# Patient Record
Sex: MALE | Race: BLACK OR AFRICAN AMERICAN | URBAN - METROPOLITAN AREA
[De-identification: names, ages, dates, MRNs, and addresses within clinical notes are randomized per-mention and may not be internally consistent; named-entity substitution may affect disease eponyms.]

---

## 2017-04-27 ENCOUNTER — HISTORICAL (OUTPATIENT)
Dept: ADMINISTRATIVE | Facility: HOSPITAL | Age: 72
End: 2017-04-27

## 2018-10-31 ENCOUNTER — HOSPITAL ENCOUNTER (OUTPATIENT)
Dept: INTENSIVE CARE | Facility: HOSPITAL | Age: 73
End: 2018-11-02
Attending: HOSPITALIST | Admitting: INTERNAL MEDICINE

## 2018-10-31 LAB
ABS NEUT (OLG): 3.2 X10(3)/MCL (ref 2.1–9.2)
ABS NEUT (OLG): 3.38 X10(3)/MCL (ref 2.1–9.2)
ALBUMIN SERPL-MCNC: 3.6 GM/DL (ref 3.4–5)
ALBUMIN SERPL-MCNC: 3.8 GM/DL (ref 3.4–5)
ALBUMIN/GLOB SERPL: 0.9 RATIO (ref 1.1–2)
ALBUMIN/GLOB SERPL: 0.9 {RATIO}
ALP SERPL-CCNC: 74 UNIT/L (ref 50–136)
ALP SERPL-CCNC: 77 UNIT/L (ref 50–136)
ALT SERPL-CCNC: 24 UNIT/L (ref 12–78)
ALT SERPL-CCNC: 27 UNIT/L (ref 12–78)
APPEARANCE, UA: CLEAR
APTT PPP: 34.7 SECOND(S) (ref 24.8–36.9)
APTT PPP: 35.9 SECOND(S) (ref 24.8–36.9)
AST SERPL-CCNC: 13 UNIT/L (ref 15–37)
AST SERPL-CCNC: 29 UNIT/L (ref 15–37)
BACTERIA SPEC CULT: ABNORMAL /HPF
BASOPHILS # BLD AUTO: 0 X10(3)/MCL (ref 0–0.2)
BASOPHILS # BLD AUTO: 0 X10(3)/MCL (ref 0–0.2)
BASOPHILS NFR BLD AUTO: 0 %
BASOPHILS NFR BLD AUTO: 0 %
BILIRUB SERPL-MCNC: 0.6 MG/DL (ref 0.2–1)
BILIRUB SERPL-MCNC: 0.7 MG/DL (ref 0.2–1)
BILIRUB UR QL STRIP: NEGATIVE
BILIRUBIN DIRECT+TOT PNL SERPL-MCNC: 0.1 MG/DL (ref 0–0.5)
BILIRUBIN DIRECT+TOT PNL SERPL-MCNC: 0.2 MG/DL (ref 0–0.5)
BILIRUBIN DIRECT+TOT PNL SERPL-MCNC: 0.4 MG/DL (ref 0–0.8)
BILIRUBIN DIRECT+TOT PNL SERPL-MCNC: 0.6 MG/DL (ref 0–0.8)
BUN SERPL-MCNC: 18 MG/DL (ref 7–18)
BUN SERPL-MCNC: 19 MG/DL (ref 7–18)
CALCIUM SERPL-MCNC: 9.1 MG/DL (ref 8.5–10.1)
CALCIUM SERPL-MCNC: 9.3 MG/DL (ref 8.5–10.1)
CHLORIDE SERPL-SCNC: 111 MMOL/L (ref 98–107)
CHLORIDE SERPL-SCNC: 117 MMOL/L (ref 98–107)
CHOLEST SERPL-MCNC: 64 MG/DL (ref 0–200)
CHOLEST/HDLC SERPL: 1.9 {RATIO} (ref 0–5)
CK SERPL-CCNC: 118 UNIT/L (ref 39–308)
CO2 SERPL-SCNC: 21 MMOL/L (ref 21–32)
CO2 SERPL-SCNC: 24 MMOL/L (ref 21–32)
COLOR UR: YELLOW
CREAT SERPL-MCNC: 1.4 MG/DL (ref 0.7–1.3)
CREAT SERPL-MCNC: 1.65 MG/DL (ref 0.7–1.3)
CRP SERPL HS-MCNC: 0.9 MG/L (ref 0–3)
EOSINOPHIL # BLD AUTO: 0.1 X10(3)/MCL (ref 0–0.9)
EOSINOPHIL # BLD AUTO: 0.1 X10(3)/MCL (ref 0–0.9)
EOSINOPHIL NFR BLD AUTO: 1 %
EOSINOPHIL NFR BLD AUTO: 2 %
ERYTHROCYTE [DISTWIDTH] IN BLOOD BY AUTOMATED COUNT: 13.1 % (ref 11.5–17)
ERYTHROCYTE [DISTWIDTH] IN BLOOD BY AUTOMATED COUNT: 13.2 % (ref 11.5–17)
ERYTHROCYTE [SEDIMENTATION RATE] IN BLOOD: 13 MM/HR (ref 0–15)
EST. AVERAGE GLUCOSE BLD GHB EST-MCNC: 111 MG/DL
GLOBULIN SER-MCNC: 4.1 GM/DL (ref 2.4–3.5)
GLOBULIN SER-MCNC: 4.3 GM/DL (ref 2.4–3.5)
GLUCOSE (UA): NEGATIVE
GLUCOSE SERPL-MCNC: 152 MG/DL (ref 74–106)
GLUCOSE SERPL-MCNC: 85 MG/DL (ref 74–106)
HBA1C MFR BLD: 5.5 % (ref 4.2–6.3)
HCT VFR BLD AUTO: 45.6 % (ref 42–52)
HCT VFR BLD AUTO: 46.9 % (ref 42–52)
HDLC SERPL-MCNC: 34 MG/DL (ref 35–60)
HGB BLD-MCNC: 14.9 GM/DL (ref 14–18)
HGB BLD-MCNC: 15.7 GM/DL (ref 14–18)
HGB UR QL STRIP: NEGATIVE
INR PPP: 1.09 (ref 0–1.27)
INR PPP: 1.11 (ref 0–1.27)
KETONES UR QL STRIP: ABNORMAL
LDLC SERPL CALC-MCNC: 17 MG/DL (ref 0–129)
LEUKOCYTE ESTERASE UR QL STRIP: ABNORMAL
LYMPHOCYTES # BLD AUTO: 1.9 X10(3)/MCL (ref 0.6–4.6)
LYMPHOCYTES # BLD AUTO: 1.9 X10(3)/MCL (ref 0.6–4.6)
LYMPHOCYTES NFR BLD AUTO: 32 %
LYMPHOCYTES NFR BLD AUTO: 32 %
MCH RBC QN AUTO: 29.9 PG (ref 27–31)
MCH RBC QN AUTO: 30.1 PG (ref 27–31)
MCHC RBC AUTO-ENTMCNC: 32.7 GM/DL (ref 33–36)
MCHC RBC AUTO-ENTMCNC: 33.5 GM/DL (ref 33–36)
MCV RBC AUTO: 89.8 FL (ref 80–94)
MCV RBC AUTO: 91.6 FL (ref 80–94)
MONOCYTES # BLD AUTO: 0.5 X10(3)/MCL (ref 0.1–1.3)
MONOCYTES # BLD AUTO: 0.6 X10(3)/MCL (ref 0.1–1.3)
MONOCYTES NFR BLD AUTO: 10 %
MONOCYTES NFR BLD AUTO: 8 %
NEUTROPHILS # BLD AUTO: 3.2 X10(3)/MCL (ref 2.1–9.2)
NEUTROPHILS # BLD AUTO: 3.38 X10(3)/MCL (ref 2.1–9.2)
NEUTROPHILS NFR BLD AUTO: 56 %
NEUTROPHILS NFR BLD AUTO: 58 %
NITRITE UR QL STRIP: NEGATIVE
PH UR STRIP: 5.5 [PH] (ref 5–9)
PLATELET # BLD AUTO: 125 X10(3)/MCL (ref 130–400)
PLATELET # BLD AUTO: 156 X10(3)/MCL (ref 130–400)
PMV BLD AUTO: 10.2 FL (ref 9.4–12.4)
PMV BLD AUTO: 10.7 FL (ref 9.4–12.4)
POTASSIUM SERPL-SCNC: 4.2 MMOL/L (ref 3.5–5.1)
POTASSIUM SERPL-SCNC: 5.6 MMOL/L (ref 3.5–5.1)
PROT SERPL-MCNC: 7.7 GM/DL (ref 6.4–8.2)
PROT SERPL-MCNC: 8.1 GM/DL (ref 6.4–8.2)
PROT UR QL STRIP: NEGATIVE
PROTHROMBIN TIME: 14.4 SECOND(S) (ref 12.2–14.7)
PROTHROMBIN TIME: 14.7 SECOND(S) (ref 12.2–14.7)
RBC # BLD AUTO: 4.98 X10(6)/MCL (ref 4.7–6.1)
RBC # BLD AUTO: 5.22 X10(6)/MCL (ref 4.7–6.1)
RBC #/AREA URNS HPF: ABNORMAL /[HPF]
SODIUM SERPL-SCNC: 141 MMOL/L (ref 136–145)
SODIUM SERPL-SCNC: 147 MMOL/L (ref 136–145)
SP GR UR STRIP: 1.01 (ref 1–1.03)
SQUAMOUS EPITHELIAL, UA: 15 /HPF (ref 0–4)
TRIGL SERPL-MCNC: 65 MG/DL (ref 30–150)
TROPONIN I SERPL-MCNC: 0.02 NG/ML (ref 0.02–0.49)
TROPONIN I SERPL-MCNC: 0.02 NG/ML (ref 0.02–0.49)
UROBILINOGEN UR STRIP-ACNC: 0.2
VLDLC SERPL CALC-MCNC: 13 MG/DL
WBC # SPEC AUTO: 5.8 X10(3)/MCL (ref 4.5–11.5)
WBC # SPEC AUTO: 5.8 X10(3)/MCL (ref 4.5–11.5)
WBC #/AREA URNS HPF: 25 /HPF (ref 0–3)

## 2018-11-02 LAB — FINAL CULTURE: NO GROWTH

## 2019-02-01 ENCOUNTER — HISTORICAL (OUTPATIENT)
Dept: LAB | Facility: HOSPITAL | Age: 74
End: 2019-02-01

## 2019-02-01 LAB
ABS NEUT (OLG): 3.73 X10(3)/MCL (ref 2.1–9.2)
APTT PPP: 35.8 SECOND(S) (ref 24.8–36.9)
BUN SERPL-MCNC: 31 MG/DL (ref 7–18)
CALCIUM SERPL-MCNC: 9 MG/DL (ref 8.5–10.1)
CHLORIDE SERPL-SCNC: 113 MMOL/L (ref 98–107)
CO2 SERPL-SCNC: 26 MMOL/L (ref 21–32)
CREAT SERPL-MCNC: 1.64 MG/DL (ref 0.7–1.3)
CREAT/UREA NIT SERPL: 19
ERYTHROCYTE [DISTWIDTH] IN BLOOD BY AUTOMATED COUNT: 14.6 % (ref 11.5–17)
GLUCOSE SERPL-MCNC: 74 MG/DL (ref 74–106)
HCT VFR BLD AUTO: 36.9 % (ref 42–52)
HGB BLD-MCNC: 12.1 GM/DL (ref 14–18)
INR PPP: 1 (ref 0–1.3)
MCH RBC QN AUTO: 30.6 PG (ref 27–31)
MCHC RBC AUTO-ENTMCNC: 32.8 GM/DL (ref 33–36)
MCV RBC AUTO: 93.4 FL (ref 80–94)
PLATELET # BLD AUTO: 139 X10(3)/MCL (ref 130–400)
PMV BLD AUTO: 9.8 FL (ref 7.4–10.4)
POTASSIUM SERPL-SCNC: 4.7 MMOL/L (ref 3.5–5.1)
PROTHROMBIN TIME: 13.7 SECOND(S) (ref 12.2–14.7)
RBC # BLD AUTO: 3.95 X10(6)/MCL (ref 4.7–6.1)
SODIUM SERPL-SCNC: 144 MMOL/L (ref 136–145)
WBC # SPEC AUTO: 6.6 X10(3)/MCL (ref 4.5–11.5)

## 2019-02-04 ENCOUNTER — HISTORICAL (OUTPATIENT)
Dept: CARDIOLOGY | Facility: HOSPITAL | Age: 74
End: 2019-02-04

## 2022-04-30 NOTE — H&P
Patient:   Abraham Dupree             MRN: [id number]            FIN: [id number]               Age:   73 years     Sex:  Male     :  1945   Associated Diagnoses:   Diabetes; Diplopia; Dizziness; Dizziness; Frontal headache; Hypertension   Author:   Aden Martinez MD      Basic Information   Admit information:  Diplopia dizziness .    Source of history:  Self.    Referral source:  Self.    History limitation:  None.       Chief Complaint   10/31/2018 16:26 CDT     pt c/o dizziness and double vision since tues. has lifepack on x2w, denies it shocking him. bilat eyes (20/50) right (21/25) left (21/25) corrective lenses left at home. cbg 97  (Modified)       History of Present Illness             The patient presents with A 73 year old male with h/o diplopia and dizziness x 3-4 days along with a frontal headache no cp no sob has multipe risk factors placed inobs to r/o tia/cva .        Review of Systems   Has per HPI otherwise all systems reviewed and negative.   Constitutional:  Weakness, Fatigue, Decreased activity.    Eye:  Blurring, Double vision.    Ear/Nose/Mouth/Throat:  Negative.    Respiratory:  Negative.    Cardiovascular:  Negative.    Gastrointestinal:  Negative.    Genitourinary:  Negative.    Hematology/Lymphatics:  Negative.    Endocrine:  Negative.    Immunologic:  Negative.    Musculoskeletal:  Negative.    Integumentary:  Negative.    Neurologic:  Dizziness, Headache.    Psychiatric:  Negative.    All other systems are negative      Health Status   Allergies:    Allergic Reactions (All)  No Known Medication Allergies,    Allergies (1) Active Reaction  No Known Medication Allergies None Documented   Current medications:  (Selected)   Inpatient Medications  Ordered  Coreg 25 mg oral tablet: 25 mg, form: Tab, Oral, BID, first dose 18 9:00:00 CDT  Dextrose 50% and Water  (50 mL vial/syringe): 25 mL, IV Push, As Directed PRN for blood glucose, Infuse over: 5 minute(s), first dose  10/31/18 22:37:00 CDT, Unconscious patient: Repeat as ordered per protocol.  Dextrose 50% and Water  (50 mL vial/syringe): 25 mL, IV Push, Once PRN for blood glucose, Infuse over: 5 minute(s), first dose 10/31/18 22:37:00 CDT, Unconscious patient: Look for other source of altered mental status.  Dextrose 50% and Water  (50 mL vial/syringe): 50 mL, IV Push, As Directed PRN for blood glucose, Infuse over: 5 minute(s), first dose 10/31/18 22:37:00 CDT, Unconscious patient: Repeat as ordered per protocol.  Dextrose 50% in Water intravenous solution: 25 mL, IV Push, As Directed PRN for blood glucose, Infuse over: 5 minute(s), first dose 10/31/18 22:37:00 CDT, Conscious patient.  Flomax 0.4 mg oral capsule: 0.4 mg, form: Cap, Oral, Daily, first dose 11/01/18 9:00:00 CDT  Lipitor 40 mg oral tablet: 40 mg, form: Tab, Oral, Daily, first dose 11/01/18 17:00:00 CDT  Lovenox 40 mg/0.4 mL subcutaneous solution: 40 mg, form: Injection, Subcutaneous, Daily, first dose 11/01/18 9:00:00 CDT  Nitrostat 0.4 mg sublingual tab: 0.4 mg, form: Tab-SL, SL, q5min PRN for chest pain, first dose 10/31/18 22:40:00 CDT  Normal Saline (0.9% NS) IV 1,000 mL: 1,000 mL, 1,000 mL, IV, 75 mL/hr, start date 10/31/18 22:18:00 CDT  NovoLog FlexPen 100 units/mL subcutaneous solution: 10 units, form: Soln, Subcutaneous, TIDAC, first dose 11/01/18 7:30:00 CDT  amLODIPine: 10 mg, form: Tab, Oral, Daily, first dose 11/01/18 9:00:00 CDT  aspirin 81 mg oral tablet, CHEWABLE: 81 mg, form: Tab-Chew, Oral, Daily, first dose 11/01/18 9:00:00 CDT  finasteride 5 mg oral tablet: 5 mg, form: Tab, Oral, Daily, first dose 11/01/18 9:00:00 CDT  glucagon: 1 mg, form: Injection, IM, q10min PRN for blood glucose, first dose 10/31/18 22:37:00 CDT, Conscious Patient with NO IV access available and BG < 45 mg/dl.  glucagon: 1 mg, form: Injection, IM, q10min PRN for blood glucose, first dose 10/31/18 22:37:00 CDT, Unconscious patient: Patient with NO IV access available and BG  < 70 mg/dl.  hydrALAZINE 20 mg/mL injectable solution: 10 mg, form: Injection, IV Push, q4hr PRN for hypertension, first dose 10/31/18 21:33:00 CDT, SBP >220; DBP >120; (Use only if labetalol fails to maintain blood pressure parameters)  insulin detemir 100 units/mL subcutaneous solution: 35 units, form: Soln, Subcutaneous, BID, first dose 11/01/18 9:00:00 CDT  insulin lispro: 2-14 units, form: Injection, Subcutaneous, As Directed PRN for blood glucose, first dose 10/31/18 22:37:00 CDT  labetalol: 10 mg, form: Soln, IV Push, q10min PRN for hypertension, first dose 10/31/18 21:33:00 CDT, SBP > 220 or DBP > 120 over 1-2 minutes, not exceed 300 mg in 24-hours  lamoTRIgine 100 mg oral tablet: 100 mg, form: Tab, Oral, BID, first dose 11/01/18 9:00:00 CDT  lisinopril: 20 mg, form: Tab, Oral, Daily, first dose 11/01/18 9:00:00 CDT  sertraline 100 mg oral tablet: 50 mg, form: Tab, Oral, At Bedtime, first dose 11/01/18 21:00:00 CDT  Prescriptions  Prescribed  Aldactone 25 mg oral tablet: 25 mg = 1 tab(s), Oral, Daily, # 30 tab(s), 2 Refill(s)  Coreg 25 mg oral tablet: 25 mg = 1 tab(s), Oral, BID, # 60 tab(s), 2 Refill(s)  Lipitor 40 mg oral tablet: 40 mg = 1 tab(s), Oral, Daily, # 30 tab(s), 2 Refill(s)  Mobic 15 mg oral tablet: 15 mg = 1 tab(s), Oral, Daily, PRN PRN inflammation, # 30 tab(s), 0 Refill(s)  Nitrostat 0.4 mg sublingual tab: 0.4 mg = 1 tab(s), SL, q5min, PRN PRN for chest pain, not to exceed 3 doses/15 min--if pain persists, seek medical attention, # 25 tab(s), 1 Refill(s)  amlodipine 10 mg oral tablet: 10 mg = 1 tab(s), Oral, Daily, # 30 tab(s), 2 Refill(s)  aspirin 81 mg oral tablet: 81 mg = 1 tab(s), Oral, Daily, # 30 tab(s), 2 Refill(s)  lisinopril 20 mg oral tablet: 20 mg = 1 tab(s), Oral, Daily, # 30 tab(s), 0 Refill(s)  Documented Medications  Documented  Flomax 0.4 mg oral capsule: 0.4 mg = 1 cap(s), Oral, Daily, # 30 cap(s), 0 Refill(s)  NovoLog FlexPen 100 units/mL subcutaneous solution: 10 units,  Subcutaneous, TIDAC, # 3 mL, 0 Refill(s)  finasteride 5 mg oral tablet: 5 mg = 1 tab(s), Oral, Daily, # 30 tab(s), 0 Refill(s)  insulin detemir 100 units/mL subcutaneous solution: 35 units, Subcutaneous, BID, # 10 mL, 0 Refill(s)  lamoTRIgine 100 mg oral tablet: 100 mg = 1 tab(s), Oral, BID, # 60 tab(s), 0 Refill(s)  sertraline 100 mg oral capsule: 100 mg, Oral, At Bedtime, 0 Refill(s),    Medications (23) Active  Scheduled: (12)  amlodipine 5 mg Tab UD  10 mg 2 tab(s), Oral, Daily  aspirin 81 mg Chew tab  81 mg 1 tab(s), Oral, Daily  atorvastatin 40 mg Tab  40 mg 1 tab(s), Oral, Daily  carvedilol 12.5 mg Tab UD  25 mg 2 tab(s), Oral, BID  enoxaparin 40mg/0.4ml Inj  40 mg 0.4 mL, Subcutaneous, Daily  finasteride 5 mg Tab  5 mg 1 tab(s), Oral, Daily  lamotrigine 100 mg Tab  100 mg 1 tab(s), Oral, BID  lisinopril 10 mg Tab UD  20 mg 2 tab(s), Oral, Daily  sertraline 50 mg Tab UD  50 mg 1 tab(s), Oral, At Bedtime  tamsulosin 0.4 mg Cap  0.4 mg 1 cap(s), Oral, Daily  Template Non-Formulary Med  10 units, Subcutaneous, TIDAC  Template Non-Formulary Med  35 units, Subcutaneous, BID  Continuous: (1)  sodium chloride 0.9% 1,000 mL  1,000 mL, IV, 75 mL/hr  PRN: (10)  Dextrose 50% in Water intravenous solution  25 mL, IV Push, As Directed  Dextrose 50% in Water intravenous solution  25 mL, IV Push, Once  Dextrose 50% in Water intravenous solution  25 mL, IV Push, As Directed  Dextrose 50% in Water intravenous solution  50 mL, IV Push, As Directed  glucagon recombinant 1 mg Inj  1 mg 1 EA, IM, q10min  glucagon recombinant 1 mg Inj  1 mg 1 EA, IM, q10min  hydrALAzine 20 mg/ml Inj- 1 mL  10 mg 0.5 mL, IV Push, q4hr  insulin (Humalog) lispro 100 u/ml Inj  2-14 units, Subcutaneous, As Directed  labetalol 5 mg/mL 20mL vial  10 mg 2 mL, IV Push, q10min  Nitroglycerin 0.4 mg TAB (per 25's)  0.4 mg 1 tab(s), SL, q5min   Problem list:    All Problems  Acute coronary syndrome / SNOMED CT 1104341767 / Confirmed  Arthritis / SNOMED CT  2271243 / Confirmed  Chest pain / SNOMED CT 60191092 / Confirmed  Cholesterol / SNOMED CT 713635262 / Confirmed  Congestive heart failure / SNOMED CT 03167355 / Confirmed  Diabetes / SNOMED CT [id number] / Confirmed  Shortness of breath / SNOMED CT 595846722 / Confirmed  Heart / SNOMED CT 073453708 / Confirmed  Hypertension / SNOMED CT 12573733 / Confirmed  Neuropathy / SNOMED CT 2215144003 / Confirmed  Prostate / SNOMED CT 261233872 / Confirmed  Pulmonary edema cardiac cause / SNOMED CT 87718386 / Confirmed  Tobacco user / SNOMED CT 580618925 / Probable,    Active Problems (13)  Acute coronary syndrome   Arthritis   Chest pain   Cholesterol   Congestive heart failure   Diabetes   Heart   Hypertension   Neuropathy   Prostate   Pulmonary edema cardiac cause   Shortness of breath   Tobacco user       Histories   Past Medical History:    No active or resolved past medical history items have been selected or recorded., HTN  cad dm chol gerd    Family History:    Entire family history is negative., reviewed   Procedure history:    GANGLION CYST.  CARDIAC STENT.   Social History        Social & Psychosocial Habits    Alcohol  04/27/2017  Use: Never    Substance Abuse  04/27/2017  Use: Never    Tobacco  05/29/2017  Use: Current every day smoker    Type: Cigarettes    10/31/2018  Use: Current every day smoker    Smoking Cessation Counseling Yes.        Physical Examination   Vital Signs   10/31/2018 21:22 CDT     Temperature Oral          36.6 DegC                             Temperature Oral (calculated)             97.88 DegF                             Peripheral Pulse Rate     64 bpm                             SpO2                      94 %                             Systolic Blood Pressure   152 mmHg  HI                             Diastolic Blood Pressure  99 mmHg  HI                             Mean Arterial Pressure, Cuff              117 mmHg    10/31/2018 20:00 CDT     Peripheral Pulse  Rate     60 bpm                             Heart Rate Monitored      60 bpm                             Respiratory Rate          19 br/min                             SpO2                      96 %                             Oxygen Therapy            Room air                             Systolic Blood Pressure   132 mmHg                             Diastolic Blood Pressure  81 mmHg                             Mean Arterial Pressure, Cuff              98 mmHg    10/31/2018 19:00 CDT     Peripheral Pulse Rate     60 bpm                             Heart Rate Monitored      60 bpm                             Respiratory Rate          18 br/min                             SpO2                      97 %                             Systolic Blood Pressure   144 mmHg  HI                             Diastolic Blood Pressure  92 mmHg  HI                             Mean Arterial Pressure, Cuff              109 mmHg    10/31/2018 18:00 CDT     Peripheral Pulse Rate     63 bpm                             Heart Rate Monitored      62 bpm                             Respiratory Rate          24 br/min                             SpO2                      97 %                             Oxygen Therapy            Room air                             Systolic Blood Pressure   130 mmHg                             Diastolic Blood Pressure  86 mmHg                             Mean Arterial Pressure, Cuff              101 mmHg    10/31/2018 17:06 CDT     Peripheral Pulse Rate     68 bpm                             Heart Rate Monitored      68 bpm                             Respiratory Rate          25 br/min  HI                             SpO2                      96 %                             Oxygen Therapy            Room air                             Systolic Blood Pressure   126 mmHg                             Diastolic Blood Pressure  88 mmHg                             Mean Arterial Pressure, Cuff              101  mmHg    10/31/2018 17:00 CDT     Oxygen Therapy            Room air    10/31/2018 16:26 CDT     Temperature Oral          37.2 DegC                             Temperature Oral (calculated)             98.96 DegF                             Peripheral Pulse Rate     76 bpm                             Respiratory Rate          18 br/min                             SpO2                      97 %                             Oxygen Therapy            Room air                             Systolic Blood Pressure   110 mmHg                             Diastolic Blood Pressure  58 mmHg  LOW    10/31/2018 10:28 CDT     Oxygen Therapy            Room air        Vital Signs (last 24 hrs)_____  Last Charted___________  Temp Oral     36.6 DegC  (OCT 31 21:22)  Heart Rate Peripheral   64 bpm  (OCT 31 21:22)  Resp Rate         19 br/min  (OCT 31 20:00)  SBP      H 152mmHg  (OCT 31 21:22)  DBP      H 99mmHg  (OCT 31 21:22)  SpO2      94 %  (OCT 31 21:22)   Intake and Output   Fluid Balance Primitives   10/9/2018 23:00 CDT      Oral Intake               0 mL                             Urine Count               1                             Stool Count               0    10/9/2018 15:00 CDT      Oral Intake               720 mL                             Urine Voided              0 mL                             Stool Count               0    10/9/2018 7:00 CDT       Oral Intake               780 mL                             Urine Catheter            1,600 mL                             Stool Count               0        General:  Alert and oriented, No acute distress.    Eye:  Pupils are equal, round and reactive to light, Normal conjunctiva.    HENT:  Normocephalic, Normal hearing, Oral mucosa is moist.    Neck:  Supple, Non-tender, No carotid bruit.    Respiratory:  Lungs are clear to auscultation, Respirations are non-labored, Breath sounds are equal.    Cardiovascular:  Normal rate, Regular rhythm, No murmur.     Gastrointestinal:  Soft, Non-tender, Non-distended, Normal bowel sounds.    Genitourinary:  No costovertebral angle tenderness.    Lymphatics:  No lymphadenopathy neck, axilla, groin.    Musculoskeletal:  Normal range of motion, Normal strength, No tenderness, No swelling.    Integumentary:  Warm, Dry, Intact.    Neurologic:  Alert, Oriented, Normal sensory, No focal deficits.    Cognition and Speech:  Oriented, Speech clear and coherent, Functional cognition intact.    Psychiatric:  Cooperative, Appropriate mood & affect, Normal judgment.       Review / Management   Laboratory Results   Today's Lab Results : PowerNote Discrete Results   10/31/2018 17:00 CDT     WBC                       5.8 x10(3)/mcL                             RBC                       5.22 x10(6)/mcL                             Hgb                       15.7 gm/dL                             Hct                       46.9 %                             Platelet                  156 x10(3)/mcL                             MCV                       89.8 fL                             MCH                       30.1 pg                             MCHC                      33.5 gm/dL                             RDW                       13.2 %                             MPV                       10.7 fL                             Abs Neut                  3.20 x10(3)/mcL                             Neutro Auto               56 %  NA                             Lymph Auto                32 %  NA                             Mono Auto                 10 %  NA                             Eos Auto                  2 %  NA                             Abs Eos                   0.1 x10(3)/mcL                             Basophil Auto             0 %  NA                             Abs Neutro                3.20 x10(3)/mcL                             Abs Lymph                 1.9 x10(3)/mcL                             Abs Mono                  0.6  x10(3)/mcL                             Abs Baso                  0.0 x10(3)/mcL                             PT                        14.4 second(s)                             INR                       1.09                             PTT                       34.7 second(s)                             UA Appear                 CLEAR                             UA Color                  YELLOW                             UA Spec Grav              1.014                             UA Bili                   Negative                             UA pH                     5.5                             UA Urobilinogen           0.2                             UA Blood                  Negative                             UA Glucose                Negative                             UA Ketones                Trace                             UA Protein                Negative                             UA Nitrite                Negative                             UA Leuk Est               2+                             UA WBC                    25 /HPF  HI                             UA RBC                    NONE SEEN                             UA Bacteria               NONE SEEN /HPF                             UA Squam Epithelial       15 /HPF  HI                             Sodium Lvl                147 mmol/L  HI                             Potassium Lvl             5.6 mmol/L  HI                             Chloride                  117 mmol/L  HI                             CO2                       21.0 mmol/L                             Calcium Lvl               9.1 mg/dL                             Glucose Lvl               85 mg/dL                             BUN                       18.0 mg/dL                             Creatinine                1.65 mg/dL  HI                             eGFR-AA                   53 mL/min/1.73 m2  NA                             eGFR-TALISHA                  44 mL/min/1.73  m2  NA                             Bili Total                0.7 mg/dL                             Bili Direct               0.10 mg/dL                             Bili Indirect             0.60 mg/dL                             AST                       29 unit/L                             ALT                       27 unit/L                             Alk Phos                  77 unit/L                             Total Protein             8.1 gm/dL                             Albumin Lvl               3.80 gm/dL                             Globulin                  4.30 gm/dL  HI                             A/G Ratio                 0.9  NA                             Troponin-I                0.02 ng/mL    10/31/2018 16:37 CDT     Blood Glucose, POC        97 mg/dL        Radiology results   Rad Results (ST)   Accession: [id number]  Order: CT Head W/O Contrast  Report Dt/Tm: 10/31/2018 17:46  Report:   CT HEAD WITHOUT CONTRAST:     HISTORY: dizziness, double vision;Other (please specify)          PATIENT RADIATION DOSE: Total                                                 DLP(mGycm)  852      As per PQRS measures, all CT scans at this facility used dose  modulation, iterative reconstruction, and/or weight based dose  adjustment when appropriate to reduce radiation dose to as low as  reasonably achievable.     COMPARISON: None available       FINDINGS: No acute intracranial hemorrhage or mass effect. There are  chronic small vessel ischemic changes. No hydrocephalus. Visualized  sinuses and mastoid air cells are well aerated. No acute calvarial  finding.     IMPRESSION: Chronic ischemic change but no acute finding       Condition:  Guarded.       Impression and Plan   Diagnosis     Diabetes (QBK86-TY E11.9).     Diplopia (BNL27-SP H53.2).     Dizziness (PNED [id number]).     Dizziness (BMX74-KA R42).     Frontal headache (VTY63-KA R51).     Hypertension (MOF33-KZ I10).     Course:   Progressing as expected.    Orders     Dizziness/ diplopia  - tia vs cva  - meds reconcilled  - no nw fnd  - multiple risk factors  - place in obs  - tele      HTN  - bp monitoring    DM  - cbg with ss   - meds restarted.     Education and Follow-up:          Counseled: Patient, Regarding diagnosis, Regarding treatment, Regarding medications.         Discharge Planning: Plan to discharge ( To home ), Diplopia, Dizziness.       Quality Measures

## 2022-04-30 NOTE — ED PROVIDER NOTES
"   Patient:   Abraham Dupree             MRN: [id number]            FIN: [id number]               Age:   73 years     Sex:  Male     :  1945   Associated Diagnoses:   Dizziness; Diplopia; Frontal headache   Author:   Ulysses Lal MD      Basic Information   Time seen: Date & time 10/31/2018 16:30:00.   History source: Patient, friend.   Arrival mode: Private vehicle, wheelchair.   History limitation: None.   Additional information: Patient's physician(s): French Graff DO, Chief Complaint from Nursing Triage Note : Chief Complaint   10/31/2018 16:26 CDT     Chief Complaint           pt c/o dizziness and double vision since tues. has lifepack on x2w, denies it shocking him. bilat eyes (20/50) right (21/25) left (21/) corrective lenses left at home  .      History of Present Illness   The patient presents with dizziness, SOrT note:  Male with recent chest pain hospitalized 2 weeks ago now with diplopia and dizziness onset yesterday first noted after waking.  T Matthew DIAZ and   72 y/o CM with hx of DM, HTN, and CHF presents to the ED c/o diplopia and dizziness with an onset of 1 day. Pt's dizziness is described as "lightheadedness". Pt states he just recently started taking new medication for his heart that may be causing this problem. Pt is also c/o a minimum headache. .  The onset was 1  days ago.  The course/duration of symptoms is constant.  The character of symptoms is lightheaded.  The degree at present is moderate.  The exacerbating factor is none.  The relieving factor is none.  Risk factors consist of medication change.  Prior episodes: none.  Therapy today: none.  Associated symptoms: altered vision.  Additional history: none.        Review of Systems   Constitutional symptoms:  Negative except as documented in HPI.   Skin symptoms:  Negative except as documented in HPI.   Eye symptoms:  Diplopia.   ENMT symptoms:  Negative except as documented in HPI.   Respiratory symptoms:  " Negative except as documented in HPI.   Cardiovascular symptoms:  Negative except as documented in HPI.   Gastrointestinal symptoms:  Negative except as documented in HPI.   Genitourinary symptoms:  Negative except as documented in HPI.   Musculoskeletal symptoms:  Negative except as documented in HPI.   Neurologic symptoms:  Headache, dizziness.    Psychiatric symptoms:  Negative except as documented in HPI.   Endocrine symptoms:  Negative except as documented in HPI.   Hematologic/Lymphatic symptoms:  Negative except as documented in HPI.   Allergy/immunologic symptoms:  Negative except as documented in HPI.             Additional review of systems information: All other systems reviewed and otherwise negative.      Health Status   Allergies: No known allergies.   Medications:  (Selected)   Prescriptions  Prescribed  Aldactone 25 mg oral tablet: 25 mg = 1 tab(s), Oral, Daily, # 30 tab(s), 2 Refill(s)  Coreg 25 mg oral tablet: 25 mg = 1 tab(s), Oral, BID, # 60 tab(s), 2 Refill(s)  Lipitor 40 mg oral tablet: 40 mg = 1 tab(s), Oral, Daily, # 30 tab(s), 2 Refill(s)  Mobic 15 mg oral tablet: 15 mg = 1 tab(s), Oral, Daily, PRN PRN inflammation, # 30 tab(s), 0 Refill(s)  Nitrostat 0.4 mg sublingual tab: 0.4 mg = 1 tab(s), SL, q5min, PRN PRN for chest pain, not to exceed 3 doses/15 min--if pain persists, seek medical attention, # 25 tab(s), 1 Refill(s)  amlodipine 10 mg oral tablet: 10 mg = 1 tab(s), Oral, Daily, # 30 tab(s), 2 Refill(s)  aspirin 81 mg oral tablet: 81 mg = 1 tab(s), Oral, Daily, # 30 tab(s), 2 Refill(s)  lisinopril 20 mg oral tablet: 20 mg = 1 tab(s), Oral, Daily, # 30 tab(s), 0 Refill(s)  Documented Medications  Documented  Flomax 0.4 mg oral capsule: 0.4 mg = 1 cap(s), Oral, Daily, # 30 cap(s), 0 Refill(s)  NovoLog FlexPen 100 units/mL subcutaneous solution: 10 units, Subcutaneous, TIDAC, # 3 mL, 0 Refill(s)  finasteride 5 mg oral tablet: 5 mg = 1 tab(s), Oral, Daily, # 30 tab(s), 0 Refill(s)  insulin  detemir 100 units/mL subcutaneous solution: 35 units, Subcutaneous, BID, # 10 mL, 0 Refill(s)  lamoTRIgine 100 mg oral tablet: 100 mg = 1 tab(s), Oral, BID, # 60 tab(s), 0 Refill(s)  sertraline 100 mg oral capsule: 100 mg, Oral, At Bedtime, 0 Refill(s).      Past Medical/ Family/ Social History   Medical history:   Acute coronary syndrome   Arthritis   Chest pain   Cholesterol   Congestive heart failure   Diabetes   Heart   Hypertension   Neuropathy   Prostate   Pulmonary edema cardiac cause   Shortness of breath   Tobacco user s.   Surgical history:    GANGLION CYST.  CARDIAC STENT..   Family history: Not significant.   Social history: Not significant.      Physical Examination               Vital Signs   Vital Signs   10/31/2018 16:26 CDT     Temperature Oral          37.2 DegC                             Temperature Oral (calculated)             98.96 DegF                             Peripheral Pulse Rate     76 bpm                             Respiratory Rate          18 br/min                             SpO2                      97 %                             Oxygen Therapy            Room air                             Systolic Blood Pressure   110 mmHg                             Diastolic Blood Pressure  58 mmHg  LOW  .      No qualifying data available.   Basic Oxygen Information   10/31/2018 10:28 CDT     Oxygen Therapy            Room air  .   General:  Alert, no acute distress.    Skin:  Warm, dry, pink.    Head:  Normocephalic, atraumatic.    Neck:  Supple, trachea midline, no tenderness, no JVD, no carotid bruit.    Eye:  Pupils are equal, round and reactive to light, extraocular movements are intact, normal conjunctiva, vision unchanged.    Ears, nose, mouth and throat:  Tympanic membranes clear, oral mucosa moist, no pharyngeal erythema or exudate.    Cardiovascular:  Regular rate and rhythm, Normal peripheral perfusion, No edema, Systolic murmur: 2 /6.    Respiratory:  Lungs are clear to  auscultation, respirations are non-labored, breath sounds are equal, Symmetrical chest wall expansion.    Chest wall:  No tenderness, No deformity, life vest in place.    Back:  Nontender, Normal range of motion, Normal alignment.    Musculoskeletal:  Normal ROM, normal strength, no tenderness, no swelling, no deformity.    Gastrointestinal:  Soft, Nontender, Non distended, Normal bowel sounds, No organomegaly.    Neurological:  Alert and oriented to person, place, time, and situation, No focal neurological deficit observed, CN II-XII intact, normal sensory observed, normal motor observed, normal speech observed, normal coordination observed.    Lymphatics:  No lymphadenopathy.   Psychiatric:  Cooperative, appropriate mood & affect, normal judgment.       Medical Decision Making   Documents reviewed:  Emergency department nurses' notes, emergency department records.    Orders  Launch Orders   Laboratory:  CMP (Order): Stat collect, 10/31/2018 16:31 CDT, Blood, Lab Collect, Print Label By Order Location, 10/31/2018 16:31 CDT  CBC w/ Auto Diff (Order): Now collect, 10/31/2018 16:31 CDT, Blood, Lab Collect, Print Label By Order Location, 10/31/2018 16:31 CDT  INR - Protime (Order): Stat collect, 10/31/2018 16:31 CDT, Blood, Lab Collect, Print Label By Order Location, 10/31/2018 16:31 CDT  PTT (Order): Stat collect, 10/31/2018 16:31 CDT, Blood, Lab Collect, Print Label By Order Location, 10/31/2018 16:31 CDT  Patient Care:  Saline Lock Insert (Order): 10/31/2018 16:31 CDT  Blood Glucose Monitoring POC (Order): 10/31/2018 16:31 CDT, Once-Unscheduled, 10/31/2018 16:31 CDT  Radiology:  CT Head W/O Contrast (Order): Stat, 10/31/2018 16:31 CDT, Other (please specify), dizziness, double vision, None, Stretcher, Rad Type, Schedule this test  Cardiology:  EKG Adult 12 Lead (Order): 10/31/2018 16:31 CDT, Stat, Once, 10/31/2018 16:31 CDT, -1, -1, 10/31/2018 16:31 CDT.   Electrocardiogram:  Rate 73, The Axis is normal.  , STT  segments Non specific changes, Ectopy None, P wave and ND interval WNL, QRS interval Left ventricular hypertrophy, LAFB, Previous EKG available None available.    Results review:  Lab results : Lab View   10/31/2018 17:00 CDT     Sodium Lvl                147 mmol/L  HI                             Potassium Lvl             5.6 mmol/L  HI                             Chloride                  117 mmol/L  HI                             CO2                       21.0 mmol/L                             Calcium Lvl               9.1 mg/dL                             Glucose Lvl               85 mg/dL                             BUN                       18.0 mg/dL                             Creatinine                1.65 mg/dL  HI                             eGFR-AA                   53 mL/min/1.73 m2  NA                             eGFR-TALISHA                  44 mL/min/1.73 m2  NA                             Bili Total                0.7 mg/dL                             Bili Direct               0.10 mg/dL                             Bili Indirect             0.60 mg/dL                             AST                       29 unit/L                             ALT                       27 unit/L                             Alk Phos                  77 unit/L                             Total Protein             8.1 gm/dL                             Albumin Lvl               3.80 gm/dL                             Globulin                  4.30 gm/dL  HI                             A/G Ratio                 0.9  NA                             Troponin-I                0.02 ng/mL                             PT                        14.4 second(s)                             INR                       1.09                             PTT                       34.7 second(s)                             WBC                       5.8 x10(3)/mcL                             RBC                       5.22 x10(6)/mcL                              Hgb                       15.7 gm/dL                             Hct                       46.9 %                             Platelet                  156 x10(3)/mcL                             MCV                       89.8 fL                             MCH                       30.1 pg                             MCHC                      33.5 gm/dL                             RDW                       13.2 %                             MPV                       10.7 fL                             Abs Neut                  3.20 x10(3)/mcL                             Neutro Auto               56 %  NA                             Lymph Auto                32 %  NA                             Mono Auto                 10 %  NA                             Eos Auto                  2 %  NA                             Abs Eos                   0.1 x10(3)/mcL                             Basophil Auto             0 %  NA                             Abs Neutro                3.20 x10(3)/mcL                             Abs Lymph                 1.9 x10(3)/mcL                             Abs Mono                  0.6 x10(3)/mcL                             Abs Baso                  0.0 x10(3)/mcL                             UA Appear                 CLEAR                             UA Color                  YELLOW                             UA Spec Grav              1.014                             UA Bili                   Negative                             UA pH                     5.5                             UA Urobilinogen           0.2                             UA Blood                  Negative                             UA Glucose                Negative                             UA Ketones                Trace                             UA Protein                Negative                             UA Nitrite                Negative                             UA Leuk Est               2+                              UA WBC                    25 /HPF  HI                             UA RBC                    NONE SEEN                             UA Bacteria               NONE SEEN /HPF                             UA Squam Epithelial       15 /HPF  HI  ,    No qualifying data available.    Radiology results:  Rad Results (ST)  < 12 hrs   Accession: [id number]  Order: CT Head W/O Contrast  Report Dt/Tm: 10/31/2018 17:46  Report:   CT HEAD WITHOUT CONTRAST:     HISTORY: dizziness, double vision;Other (please specify)          PATIENT RADIATION DOSE: Total                                                 DLP(mGycm)  852      As per PQRS measures, all CT scans at this facility used dose  modulation, iterative reconstruction, and/or weight based dose  adjustment when appropriate to reduce radiation dose to as low as  reasonably achievable.     COMPARISON: None available       FINDINGS: No acute intracranial hemorrhage or mass effect. There are  chronic small vessel ischemic changes. No hydrocephalus. Visualized  sinuses and mastoid air cells are well aerated. No acute calvarial  finding.     IMPRESSION: Chronic ischemic change but no acute finding    .      Reexamination/ Reevaluation   Vital signs   Basic Oxygen Information   10/31/2018 10:28 CDT     Oxygen Therapy            Room air        Impression and Plan   Diagnosis   Dizziness (UCH53-PX R42)   Diplopia (UIM80-AP H53.2)   Frontal headache (NQU42-RC R51)      Calls-Consults   -  10/31/2018 19:57:00 , Sobeida Darby med, phone call, recommends admit, CVA work-up.    Plan   Condition: Stable.    Disposition: Admit time  10/31/2018 20:07:00, Place in Observation Telemetry Unit.    Counseled: Patient, Regarding diagnosis, Regarding diagnostic results, Regarding treatment plan, Patient indicated understanding of instructions.    Notes: Ernesto MONTERROSO, acted solely as a scribe for and in the presence of  who performed the service., LOC  Dr. Lal, performed all activities above as documented and I am in full agreement with the above documentation..

## 2022-04-30 NOTE — DISCHARGE SUMMARY
Patient:   Abraham Dupree             MRN: [id number]            FIN: [id number]               Age:   73 years     Sex:  Male     :  1945   Associated Diagnoses:   None   Author:   Rusty MARKHAM, Oleg GOMEZ      Discharge Information      Discharge Summary Information   Physicians   Attending Physician - Rusty MARKHAM, Oleg GOMEZ  Admitting Physician - Juan MARKHAM, Aden  Consulting Physician - Shawn MARKHAM, Patsy BRUCE  Consulting Physician - Mateo MARKHAM, Carroll  Consulting Physician - Macho MARKHAM, Neal  Primary Care Physician - Physician MD, Non Staff   Discharge Diagnosis   1. Chronic kidney disease   2. Chronic systolic heart failure   3. Ophthalmoplegia   4. Acute cerebrovascular accident (CVA)      Discharge Medications   Scheduled: (7)  clopidogrel 75 mg Tab UD 75 mg 1 tab(s), Oral, Daily  enoxaparin 40mg/0.4ml Inj 40 mg 0.4 mL, Subcutaneous, Daily  finasteride 5 mg Tab 5 mg 1 tab(s), Oral, Daily  insulin (Humalog) lispro 100 u/ml Inj 10 units 0.1 mL, Subcutaneous, TIDAC  lamotrigine 100 mg Tab 100 mg 1 tab(s), Oral, BID  sertraline 100 mg 1 tab(s), Oral, At Bedtime  tamsulosin 0.4 mg Cap 0.4 mg 1 cap(s), Oral, Daily  Continuous: (0)  PRN: (11)  dextrose 50% vial 50 ml 12.5 gm 25 mL, IV Push, As Directed  dextrose 50% vial 50 ml 12.5 gm 25 mL, IV Push, Once  dextrose 50% vial 50 ml 12.5 gm 25 mL, IV Push, As Directed  dextrose 50% vial 50 ml 25 gm 50 mL, IV Push, As Directed  diphenhydrAMINE 25 mg Cap UD 25 mg 1 cap(s), Oral, Once a day (at bedtime)  glucagon recombinant 1 mg Inj 1 mg 1 EA, IM, q10min  glucagon recombinant 1 mg Inj 1 mg 1 EA, IM, q10min  hydrALAzine 20 mg/ml Inj- 1 mL 10 mg 0.5 mL, IV Push, q4hr  insulin (Humalog) lispro 100 u/ml Inj 2-14 units, Subcutaneous, As Directed  labetalol 5 mg/mL 20mL vial 10 mg 2 mL, IV Push, q10min  Nitroglycerin 0.4 mg TAB (per 25's) 0.4 mg 1 tab(s), SL, q5min         Education   Discharge - 18 8:19:00 CDT, Dis/Trn Home Health.   Discharge Activity - Activity as  Tolerated   Discharge Diet - Cardiac       Followup   [facility]         Hospital Course   Hospital Course   Patient is a 73 year old male who presented to the ED on 10/31/18 with symptoms of diplopia, off-balance and frontal headache since he woke up on 10/30. He went back to sleep and woke up later that afternoon with the same symptoms. He decided to come to the ED on 10/31 after home health came and noticed these symptoms. Denies any numbness, tingling or weakness. No changes in speech or issues swallowing. Patient stated he was recently began a diuretic for history of heart failure. He has been wearing a LifeVest for 2 weeks and was advised to wear the LifeVest for another month and after that month, a decision will be placed for an ICD at that time. He takes Lipitor and ASA 81 mg daily, no other anticoagulants. He also has a history of CAD s/p stenting and CKD [facility] head in the ED was negative. Labs showed dirty urine catch in the ED, reflex to cultures were obtained however no antibiotics were initiated and he denies any dysuria. Patient was admitted to hospitalist services for further stroke workup. Results of MRI was negative for ischemia and acute CVA. MRA head and neck, Carotid US, and Echo were also negative. ECHO from 1 month ago showed an EF of 15%. ECHO while admitted revealed an EF of 30-35%. HbA1c was 5.5%. Neurology recommended to continue Plavix and ASA and to follow up in office in 4 weeks. Pt tolerated PT/OT well.  On day of discharge, patient was feeling well and headache improved. Patient was discharged back to home with stable vitals and labwork.  Total time spent in discharge: 31 minutes.       Physical Examination      Vital Signs (last 24 hrs)_____  Last Charted___________  Temp Oral     36.6 DegC  (NOV 02 07:33)  Heart Rate Peripheral   73 bpm  (NOV 02 07:33)  Resp Rate         20 br/min  (NOV 02 07:00)  SBP      H 153mmHg  (NOV 02 07:33)  DBP      H  99mmHg  (NOV 02 07:33)  SpO2      98 %  (NOV 02 07:33)   General: No acute distress, Awake, Alert, Oriented x3  HEENT: no scleral icterus, OP clear,  Respiratory: CTA bilateral, no wheezes, rales, or rhonchi  Cardiovascular: Regular rate and rhythm, no murmurs, rubs or gallops, +s1/s2  Gastrointestinal: soft, nontender, nondistended, +BS  Musculoskeletal: Normal range of motion, no pertinent deformity  Integumentary: clean, warm, dry, intact  Neurologic: right eye does not cross mid line at adduct       Results Review   General results   Today's results: 11/2/2018 11:55 CDT      POC CBG                   142 mg/dL  HI      Discharge Plan   Discharge Summary Plan   Discharge disposition: discharge to home (into the care of family member, self care).     Prescriptions: reviewed with patient, written and given to patient.     Education and Follow-up   Counseled: patient, regarding diagnosis, regarding treatment, regarding medications.     I, Jens Kim acted solely as a scribe for and in the presence of  Dr. Oleg Jackson MD who performed the service   I, Mary Snow, acted solely as a scribe for and in the presence of Dr. Oleg Jackson MD who performed the service.    I, Dr Oleg Ojeda', have read note from scribe and I agree with note except as amended by me. All information was dictated from my history and my examination of patient.

## 2022-05-04 NOTE — HISTORICAL [facility] CERNER
This is a historical note converted from [facility]. Formatting and pictures may have been removed.  Please reference [facility] for original formatting and attached multimedia. Chief Complaint  pt c/o dizziness and double vision since tues. has lifepack on x2w, denies it shocking him. bilat eyes (20/50) right (21/25) left (21/25) corrective lenses left at home. cbg 97  Reason for Consultation  dizziness, diplopia (stroke)  History of Present Illness  admitted 10/31/18  developed diplopia & dizziness on Tuesday 10/30/18, along w/ a frontal HA  HH nurse went by yesterday and instructed him to present to the hospital for further w/u  his CT head showed chronic ischemic changes, no acute findings  ?  he was here in early-mid Oct w/ CP  ef 20%, had life vest placed & meds changed  he thinks his symptoms could be r/t meds  was started on aldactone 25mg daily  had f/u w/ [facility] on 10/22, instructions were to continue coreg 25, imdur 60 & aldactone 25 (he is also supposed to be on lisinopril-hctz 20-12.5)  ?  h/o dm, htn, nicmo w/ ef 15-20%  +smokes  is on asa 81 & lipitor 40  Review of Systems  CONSTITUTIONAL: As per HPI  EYE: Negative except for those listed in HPI?  ENMT: No tinnitus, hearing loss  RESPIRATORY: No SOB, cough  CARDIOVASCULAR: No chest pain, palpitations  GASTROINTESTINAL: No nausea, vomiting, diarrhea  GENITOURINARY: No incontinence, urinary problems  MUSCULOSKELETAL: No joint pain, swelling  NEUROLOGICAL: Negative except for those listed in HPI?  PSYCHOLOGICAL: Negative except for those listed in HPI?  ENDOCRINE: Negative except for those listed in HPI  ALL OTHER ROS: Reviewed and negative.  Physical Exam  Vitals & Measurements  T:?36.5? ?C (Oral)? TMIN:?36.4? ?C (Oral)? TMAX:?37.2? ?C (Oral)? HR:?60(Peripheral)? RR:?19? BP:?134/85? BP:?108/79(Sitting)? BP:?103/74(Standing)? BP:?136/85(Supine)? SpO2:?96%? WT:?96?kg?  GENERAL:?NAD, calm, cooperative, appropriate  RESP: CTAB  HEART: RRR  no LE edema  MENTAL STATUS:  Oriented x4, follows commands reliably  SPEECH/LANGUAGE: Clear, coherent  CN:  R-EKTA, pupils ok  No tactile or motor facial asymmetry  t/p midline  Motor:?No focal weakness  Cerebellar: No?tremor or dysmetria  Sensory: Normal to tactile stim. /vibration  DTRs: Normal (+2)  Gait: not observed  Assessment/Plan  stroke - dizziness, diplopia, HA  nicmo w/ ef 15-20%, has life vest  dm...a1c 5.5 yesterday  htn  dyslipidemia  ckd  ?  ?  change his asa to plavix  cont?statin  ok to have therapy evals today  ok for diet  his echo is still pending interpretation  further to follow from dr. heller   Problem List/Past Medical History  Ongoing  Acute coronary syndrome  Arthritis  Chest pain  Cholesterol  Congestive heart failure  Diabetes  Heart  Hypertension  Neuropathy  Prostate  Pulmonary edema cardiac cause  Shortness of breath  Tobacco user  Historical  No qualifying data  Procedure/Surgical History  CARDIAC STENT  GANGLION CYST   Medications  Inpatient  aspirin 325 mg oral tablet, 325 mg= 1 tab(s), Oral, Daily  Dextrose 50% and Water (50 mL vial/syringe), 25 gm= 50 mL, IV Push, As Directed, PRN  Dextrose 50% and Water (50 mL vial/syringe), 12.5 gm= 25 mL, IV Push, As Directed, PRN  Dextrose 50% and Water (50 mL vial/syringe), 12.5 gm= 25 mL, IV Push, Once, PRN  Dextrose 50% in Water intravenous solution, 12.5 gm= 25 mL, IV Push, As Directed, PRN  finasteride 5 mg oral tablet, 5 mg= 1 tab(s), Oral, Daily  Flomax 0.4 mg oral capsule, 0.4 mg= 1 cap(s), Oral, Daily  glucagon, 1 mg= 1 EA, IM, q10min, PRN  glucagon, 1 mg= 1 EA, IM, q10min, PRN  hydrALAZINE 20 mg/mL injectable solution, 10 mg= 0.5 mL, IV Push, q4hr, PRN  insulin lispro, 2-14 units, Subcutaneous, As Directed, PRN  insulin lispro 100 units/mL injectable solution, 10 units= 0.1 mL, Subcutaneous, TIDAC  labetalol, 10 mg= 2 mL, IV Push, q10min, PRN  lamoTRIgine 100 mg oral tablet, 100 mg= 1 tab(s), Oral, BID  Lovenox 40 mg/0.4 mL subcutaneous solution, 40 mg= 0.4 mL,  Subcutaneous, Daily  Nitrostat 0.4 mg sublingual tab, 0.4 mg= 1 tab(s), SL, q5min, PRN  sertraline 50 mg oral tablet, 50 mg= 1 tab(s), Oral, At Bedtime  Home  Aldactone 25 mg oral tablet, 25 mg= 1 tab(s), Oral, Daily, 2 refills  amlodipine 10 mg oral tablet, 10 mg= 1 tab(s), Oral, Daily, 2 refills  aspirin 81 mg oral tablet, 81 mg= 1 tab(s), Oral, Daily, 2 refills  Coreg 25 mg oral tablet, 25 mg= 1 tab(s), Oral, BID, 2 refills  finasteride 5 mg oral tablet, 5 mg= 1 tab(s), Oral, Daily  Flomax 0.4 mg oral capsule, 0.4 mg= 1 cap(s), Oral, Daily  insulin detemir 100 units/mL subcutaneous solution, 35 units, Subcutaneous, BID  lamoTRIgine 100 mg oral tablet, 100 mg= 1 tab(s), Oral, BID  Lipitor 40 mg oral tablet, 40 mg= 1 tab(s), Oral, Daily, 2 refills  lisinopril 20 mg oral tablet, 20 mg= 1 tab(s), Oral, Daily  Mobic 15 mg oral tablet, 15 mg= 1 tab(s), Oral, Daily, PRN,? ?Not taking  Nitrostat 0.4 mg sublingual tab, 0.4 mg= 1 tab(s), SL, q5min, PRN, 1 refills  NovoLog FlexPen 100 units/mL subcutaneous solution, 10 units, Subcutaneous, TIDAC  sertraline 100 mg oral capsule, 100 mg, Oral, At Bedtime  Allergies  No Known Medication Allergies  Social History  Alcohol  Never, 04/27/2017  Substance Abuse  Never, 04/27/2017  Tobacco  Current every day smoker, Yes, 10/31/2018  Current every day smoker, Cigarettes, 05/29/2017  Family History  Family history is negative  Lab Results  Test Name Test Result Date/Time Comments   Sodium Lvl 141 mmol/L 10/31/2018 22:25 CDT    Potassium Lvl 4.2 mmol/L 10/31/2018 22:25 CDT    Chloride 111 mmol/L (High) 10/31/2018 22:25 CDT    CO2 24.0 mmol/L 10/31/2018 22:25 CDT    Calcium Lvl 9.3 mg/dL 10/31/2018 22:25 CDT    Glucose Lvl 152 mg/dL (High) 10/31/2018 22:25 CDT    BUN 19.0 mg/dL (High) 10/31/2018 22:25 CDT    Creatinine 1.40 mg/dL (High) 10/31/2018 22:25 CDT    AST 13 unit/L (Low) 10/31/2018 22:25 CDT    ALT 24 unit/L 10/31/2018 22:25 CDT    Hgb A1c 5.5 % 10/31/2018 22:25 CDT    CRP High  Sens 0.90 mg/L 10/31/2018 22:25 CDT    Chol 64 mg/dL 10/31/2018 22:25 CDT    HDL 34 mg/dL (Low) 10/31/2018 22:25 CDT    Trig 65 mg/dL 10/31/2018 22:25 CDT    LDL 17 mg/dL 10/31/2018 22:25 CDT    Total  unit/L 10/31/2018 22:25 CDT    Troponin-I 0.02 ng/mL 10/31/2018 22:25 CDT 0.5 ng/mL is the accepted discriminant level for mycardial injury rather than a statistical normal limit for the general population.   PT 14.7 second(s) 10/31/2018 22:25 CDT    INR 1.11 10/31/2018 22:25 CDT    PTT 35.9 second(s) 10/31/2018 22:25 CDT APTT equivalent to heparin levels of 0.2-0.4 u/mL as determined by Heparin.   WBC 5.8 x10(3)/mcL 10/31/2018 22:25 CDT    RBC 4.98 x10(6)/mcL 10/31/2018 22:25 CDT    Hgb 14.9 gm/dL 10/31/2018 22:25 CDT    Hct 45.6 % 10/31/2018 22:25 CDT    Platelet 125 x10(3)/mcL (Low) 10/31/2018 22:25 CDT    UA Leuk Est 2+ (Abnormal) 10/31/2018 17:00 CDT    UA WBC 25 /HPF (High) 10/31/2018 17:00 CDT    UA Bacteria NONE SEEN 10/31/2018 17:00 CDT    Diagnostic Results  (10/31/2018 17:40 CDT CT Head W/O Contrast)  ?Chronic ischemic change but no acute finding?? [1]  ?   (11/01/2018 12:05 CDT MRI Brain W W/O Contrast)  1. No hemorrhage or acute vascular insults.??  2. Left frontal subcentimeter focus of cortical-based enhancement may reflect a small vascular malformation. Comparison to prior outside imaging, if available, may preclude further workup. If not, consider follow-up brain MRI with and without contrast in 3 months to document stability, unless clinically warranted sooner.?? [2]  ?  (11/01/2018 12:04 CDT MR Angio Head W/O Contrast)  1. No flow signal return from the right KACIE A2 segment.??  2. Otherwise no large vessel occlusions or critical stenoses.?? [3]  ?  (11/01/2018 12:07 CDT MR Angio Neck W Contrast)  1. Focal nonopacification at the right vertebral artery origin.??  2. Otherwise no large vessel occlusions or critical stenoses?? [4]  ?  carotid u/s  negative     [1]?CT Head W/O Contrast; Adán  Barby MARKHAM 10/31/2018 17:40 CDT  [2]?MRI Brain W W/O Contrast; Andres Coreas MD 11/01/2018 12:05 CDT  [3]?MR Angio Head W/O Contrast; Andres Coreas MD 11/01/2018 12:04 CDT  [4]?MR Angio Neck W Contrast; Andres Coreas MD 11/01/2018 12:07 CDT   I have seen and examined patient and agree with above.  ?  This is a 73-year-old man with a history of hypertension, diabetes, congestive heart failure?who comes in with acute onset of diplopia?starting on 10/30/2018.? Patient feels that double vision is in the?left eye?because it is more noticeable when he closes his right eye.? He denies any numbness in the face, dysarthria, dysphagia, weakness in his extremities.? 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 ?Pupils are equal.? I reviewed MRI and did not see any signs of diffusion restriction to suggest stroke.? Carotid ultrasound was negative.  ?  Impression: right EKTA, possible MRI neg brainstem stroke,  ?  Plan: switch ASA to plavix